# Patient Record
Sex: FEMALE | Race: OTHER
[De-identification: names, ages, dates, MRNs, and addresses within clinical notes are randomized per-mention and may not be internally consistent; named-entity substitution may affect disease eponyms.]

---

## 2017-09-06 ENCOUNTER — HOSPITAL ENCOUNTER (OUTPATIENT)
Dept: HOSPITAL 74 - FMAMMOTONE | Age: 52
Discharge: HOME | End: 2017-09-06
Attending: SURGERY
Payer: COMMERCIAL

## 2017-09-06 DIAGNOSIS — N60.31: ICD-10-CM

## 2017-09-06 DIAGNOSIS — R92.1: ICD-10-CM

## 2017-09-06 DIAGNOSIS — N64.89: Primary | ICD-10-CM

## 2017-09-06 DIAGNOSIS — N62: ICD-10-CM

## 2017-09-06 DIAGNOSIS — N60.11: ICD-10-CM

## 2017-09-06 PROCEDURE — A4648 IMPLANTABLE TISSUE MARKER: HCPCS

## 2017-09-06 PROCEDURE — 0HBT3ZX EXCISION OF RIGHT BREAST, PERCUTANEOUS APPROACH, DIAGNOSTIC: ICD-10-PCS | Performed by: SURGERY

## 2017-09-07 NOTE — PATH
Surgical Pathology Report



Patient Name:  LAUREN CALLAHAN

Accession #:  [id number]

[facility]. Rec. #:  [id number]                                                        

   /Age/Gender:  1965 (Age: 52) / F

Account:  [id number]                                                          

             Location: [place] [facility]

Taken:  2017

Received:  2017

Reported:  2017

Physicians:  Chari Kaur M.D.

  



Specimen(s) Received

A: RIGHT BREAST SPECIMEN- SITE 1 - WITH CALCIFICATIONS 

B: RIGHT BREAST SPECIMEN - SITE 2 - WITH CALCIFICATIONS 

C: RIGHT BREAST SPECIMEN - SITE 2 - WITHOUT CALCIFICATIONS 

D: RIGHT BREAST SPECIMEN - SITE 3 - WITH CALCIFICATIONS 

E: RIGHT BREAST SPECIMEN - SITE 3 - WITHOUT CALCIFIATIONS 





Clinical History

Nonpalpable lesion, biopsy proven atypical ductal hyperplasia right

Mammographic findings: Microcalcification, suspicious







Final Diagnosis

A. BREAST, RIGHT, SITE #1, UPPER OUTER POSTERIOR, WITH CALCIFICATIONS,

STEREOTACTIC BIOPSY:  

BENIGN BREAST TISSUE SHOWING COLUMNAR CELL CHANGE AND MICROCYST FORMATION WITH

ASSOCIATED CALCIFICATIONS.



B. BREAST, RIGHT, SITE #2, UPPER OUTER, WITH CALCIFICATIONS, STEREOTACTIC

BIOPSY:  

ATYPICAL DUCTAL HYPERPLASIA (ADH) WITH ASSOCIATED CALCIFICATIONS, ATYPICAL

LOBULAR HYPERPLASIA (ALH) AND FIBROCYSTIC CHANGES INCLUDING CYSTIC APOCRINE

METAPLASIA AND STROMAL FIBROSIS.



C. BREAST, RIGHT, SITE #2, UPPER OUTER, WITHOUT CALCIFICATIONS, STEREOTACTIC

BIOPSY:  

ATYPICAL DUCTAL HYPERPLASIA (ADH) WITH ASSOCIATED CALCIFICATIONS.



D. BREAST, RIGHT, SITE #3, WITH CALCIFICATIONS, UPPER OUTER, STEREOTACTIC

BIOPSY:  

BENIGN BREAST TISSUE SHOWING FIBROCYSTIC CHANGES INCLUDING CYSTIC APOCRINE

METAPLASIA AND STROMAL FIBROSIS.



E. BREAST, RIGHT, SITE #3, UPPER OUTER, WITHOUT CALCIFICATIONS, STEREOTACTIC

BIOPSY:  

BENIGN BREAST TISSUE SHOWING FIBROCYSTIC CHANGES INCLUDING STROMAL FIBROSIS AND

CYSTIC APOCRINE METAPLASIA.





***Electronically Signed***

Lauren Escobar M.D.





Gross Description

A. Received in formalin labeled "right breast with calcifications site 1-upper

outer posterior," is a 2.2 x 2.2 x 0.3 cm aggregate of multiple tan-yellow,

irregular to cylindrical portions of fibroadipose tissue. The formalin is

filtered and the specimen is entirely submitted in one cassette.



B. Received in formalin labeled "right breast with calcifications site 2-upper

outer," is a 2.3 x 2.0 x 0.3 cm aggregate of multiple tan-yellow, irregular to

cylindrical portions of fibroadipose tissue. The formalin is filtered and the

specimen is entirely submitted in one cassette.



C. Received in formalin labeled "right breast without calcifications site 2

upper outer," are 7 tan-yellow, cylindrical portions of fibroadipose tissue

ranging firm 0.5-1.5 cm in length and averaging 0.3 cm in diameter. The

specimens are submitted in toto in one cassette.



D. Received in formalin labeled "right breast with calcifications site 3 upper

outer," are 3 tan-yellow, cylindrical portions of fibroadipose tissue ranging

from 0.5-1.8 cm in length and averaging 0.3 cm in diameter. The specimens are

submitted in toto in one cassette.



E. Received in formalin labeled "right breast without calcifications site 3

upper outer," are 4 tan-yellow, cylindrical portions of fibroadipose tissue

ranging from 0.6-1.8 cm in length and averaging 0.3 cm in diameter. The

specimens are submitted in toto in one cassette.

Time to formalin fixation: Not given

Total formalin fixation time: Approximately 6 hours

## 2017-09-12 NOTE — OP
DATE OF OPERATION:  09/06/2017

 

PREOPERATIVE DIAGNOSIS:  Abnormal right mammography.

 

POSTOPERATIVE DIAGNOSIS:  Abnormal right mammography.

 

PROCEDURE:  Right breast stereotactic needle biopsy with clip x3.

 

SURGEON:  Chari Kaur MD

 

ANESTHESIA:  Local.

 

COMPLICATIONS:  None.

 

This was a sterile procedure.

 

INDICATION FOR PROCEDURE:  The patient presented with a screening mammography that

noted multiple areas of microcalcification in the upper outer right breast.  She had

a needle biopsy of the one closest to the nipple which showed atypical ductal

hypoplasia.  She had a preoperative MRI that noted two areas of enhancement in the

right 10 o'clock and right 11 o'clock locations, as well as others that seemed to

correspond to the calcifications.  They also noted an enhancement in the outer left

breast for which a directed ultrasound was recommended by the radiologist.  The

recommendation was an attempted stereotactic biopsy of at least 3 sets of

calcifications in the upper outer right breast to make sure there is no further

atypia or DCIS.  The procedure was discussed with her, including the need for a clip.

 

PROCEDURE IN DETAIL:  The patient was brought to [facility] in

[place], laid prone on the [place] table.  Using the lateral approach, the

calcifications first in the upper outer posteriorly were recognized.  A sterile prep

was obtained.  A target was chosen.  There was a positive stroke margin.  Using

Betadine and 1% lidocaine, a 10-gauge Suros device was used to take several cores in

this area.  The cores showed calcification within them.  These were handled using the

calcification protocol.  A T-shaped clip was deployed in the area.  Hemostasis was

assured with direct pressure.

 

Next, a second set of calcifications that were farthest away from this posterior site

was also recognized in the upper outer right breast.  A sterile prep was obtained.  A

target was chosen.  There was a positive stroke margin.  Using Betadine and 1%

lidocaine, a 10-gauge Suros device was used to take several cores in these

calcifications.  A bow-shaped petite clip was deployed in the area.  Hemostasis was

assured with direct pressure.  The specimen radiograph showed cores with

calcification.  These were handled using the calcification protocol.

 

Next, the third set of calcifications that was closer to the first set was

recognized.  A sterile prep was obtained.  A target was chosen.  There was a positive

stroke margin.  Using Betadine and 1% lidocaine, a 10-gauge Suros device was used to

take several cores in this cluster of microcalcifications, and specimen radiographs

showed cores with calcifications.  These were handled using the calcification

protocol.  A bar-shaped clip was deployed in the area.  Hemostasis was assured with

direct pressure.  Once the biopsies were all done and hemostasis was assured, the

incisions were all closed with Steri-Strips.  She tolerated the procedure well, left

the breast room and signed out in good condition.

 

REMI [id number]

DD: 09/12/2017 14:36

DT: 09/12/2017 17:24

Job #:  [id number]

## 2017-09-25 ENCOUNTER — HOSPITAL ENCOUNTER (INPATIENT)
Dept: HOSPITAL 74 - JSAMEDAYSX | Age: 52
LOS: 2 days | Discharge: HOME HEALTH | DRG: 581 | End: 2017-09-27
Attending: SURGERY | Admitting: SURGERY
Payer: COMMERCIAL

## 2017-09-25 VITALS — BODY MASS INDEX: 23.6 KG/M2

## 2017-09-25 DIAGNOSIS — D05.91: Primary | ICD-10-CM

## 2017-09-25 PROCEDURE — A9541 TC99M SULFUR COLLOID: HCPCS

## 2017-09-25 PROCEDURE — 0HBV0ZZ EXCISION OF BILATERAL BREAST, OPEN APPROACH: ICD-10-PCS | Performed by: SURGERY

## 2017-09-25 PROCEDURE — 0HUV0JZ SUPPLEMENT BILATERAL BREAST WITH SYNTHETIC SUBSTITUTE, OPEN APPROACH: ICD-10-PCS | Performed by: SURGERY

## 2017-09-25 PROCEDURE — 07B50ZX EXCISION OF RIGHT AXILLARY LYMPHATIC, OPEN APPROACH, DIAGNOSTIC: ICD-10-PCS | Performed by: SURGERY

## 2017-09-25 RX ADMIN — HYDROMORPHONE HYDROCHLORIDE PRN MG: 1 INJECTION, SOLUTION INTRAMUSCULAR; INTRAVENOUS; SUBCUTANEOUS at 21:33

## 2017-09-25 RX ADMIN — HYDROMORPHONE HYDROCHLORIDE PRN MG: 1 INJECTION, SOLUTION INTRAMUSCULAR; INTRAVENOUS; SUBCUTANEOUS at 16:31

## 2017-09-25 RX ADMIN — SODIUM CHLORIDE, POTASSIUM CHLORIDE, SODIUM LACTATE AND CALCIUM CHLORIDE SCH MLS: 600; 310; 30; 20 INJECTION, SOLUTION INTRAVENOUS at 16:00

## 2017-09-25 RX ADMIN — ACETAMINOPHEN SCH MG: 325 TABLET ORAL at 18:52

## 2017-09-25 NOTE — OP
Operative Note





- Note:


Operative Date: 09/25/17


Pre-Operative Diagnosis: Right breast atypical ductal hyperplasia


Operation: Bilateral Nipple-Sparing Mastectomies


Post-Operative Diagnosis: Same as Pre-op


Surgeon: Chari Kaur


Assistant: Lauren Looney


Anesthesiologist/CRNA: Dylon Nielson


Anesthesia: MAC


Specimens Removed: bilat breast tissue


Estimated Blood Loss (mls): 150


Fluid Volume Replaced (mls): 1,200


Operative Report Dictated: Yes

## 2017-09-25 NOTE — HP
History & Physical Update





- History


History: No Change





- Physical


Physical: No Change





- Assessment


Assessment: No Change





- Plan


Plan: No Change (patient cleared for surgery by PCP as of 9/15/17)

## 2017-09-25 NOTE — SURG
Surgery First Assist Note


First Assist: Lauren Looney PA-C


Date of Service: 09/25/17


Diagnosis: 


Absence of Both Breasts after Bilateral Nipple-Sparing Mastectomies





Procedure: 


 Bilateral Immediate Post-Mastectomy Breast Reconstruction using Shaped 

Submuscular Implants and Alloderm.


I was present for the entirety of the operative procedure. For further detail, 

please refer to operative report.








Visit type





- Case Type


Case Type: Scheduled Admission





- New patient


This patient is new to me today: Yes


Date on this admission: 09/25/17

## 2017-09-25 NOTE — OP
Operative Note





- Note:


Operative Date: 09/25/17


Pre-Operative Diagnosis: Absence of Both Breasts after Bilateral Nipple-Sparing 

Mastectomies


Operation: Bilateral Immediate Post-Mastectomy Breast Reconstruction using 

Shaped Submuscular Implants and Alloderm.


Implants: [place] MemoryShape Medium Height/High Profile Silicone Implants- 440cc


Post-Operative Diagnosis: Same as Pre-op


Surgeon: Mathew Jay


Assistant: Lauren Looney


Anesthesia: MAC (With Intercostal Block)


Drains & Tubes with Location: Gonzalez Mendoza Drains- Round, One Each Breast


Operative Report Dictated: Yes

## 2017-09-25 NOTE — SURG
Surgery First Assist Note


First Assist: Lauren Looney PA-C


Date of Service: 09/25/17


Diagnosis: 


Absence of Both Breasts after Bilateral Nipple-Sparing Mastectomies





Procedure: 


Bilateral Immediate Post-Mastectomy Breast Reconstruction using Shaped 

Submuscular Implants and Alloderm.


I was present for the entirety of the operative procedure. For further detail, 

please refer to operative report.








Visit type





- Case Type


Case Type: Scheduled Admission





- Emergency


Emergency Visit: No





- New patient


This patient is new to me today: Yes


Date on this admission: 09/25/17

## 2017-09-26 RX ADMIN — ACETAMINOPHEN SCH MG: 325 TABLET ORAL at 20:49

## 2017-09-26 RX ADMIN — ACETAMINOPHEN SCH MG: 325 TABLET ORAL at 01:24

## 2017-09-26 RX ADMIN — HYDROMORPHONE HYDROCHLORIDE PRN MG: 1 INJECTION, SOLUTION INTRAMUSCULAR; INTRAVENOUS; SUBCUTANEOUS at 05:08

## 2017-09-26 RX ADMIN — ACETAMINOPHEN SCH MG: 325 TABLET ORAL at 14:30

## 2017-09-26 RX ADMIN — ACETAMINOPHEN SCH MG: 325 TABLET ORAL at 07:38

## 2017-09-26 RX ADMIN — SODIUM CHLORIDE, POTASSIUM CHLORIDE, SODIUM LACTATE AND CALCIUM CHLORIDE SCH MLS/HR: 600; 310; 30; 20 INJECTION, SOLUTION INTRAVENOUS at 06:42

## 2017-09-26 NOTE — OP
DATE OF OPERATION:  09/25/2017

 

PREOPERATIVE DIAGNOSIS:  Absence of both breasts after bilateral nipple-sparing

mastectomies.  

 

POSTOPERATIVE DIAGNOSIS:  Absence of both breasts after bilateral nipple-sparing

mastectomies.  

 

PROCEDURE PERFORMED:  Bilateral immediate post-mastectomy breast reconstruction using

shaped submuscular implants with AlloDerm.  

 

SURGEON:  Mathew Jay MD

 

ANESTHESIA:  MAC with intercostal blocks.  

 

BRIEF HISTORY:  The patient is a 52-year-old female who is for bilateral

nipple-sparing mastectomies with Dr. Kaur, and reconstructive options including

both implant and autologous tissue have been discussed.  The patient is tall and thin

and understands that reconstruction initially likely will not be as large as the size

requested.  

 

THE PROCEDURE:  The patient was on the operating table at the conclusion of bilateral

nipple-sparing mastectomies by Dr. Kaur.  

 

The right breast was addressed first.  The submuscular pocket was developed from the

inferolateral border of the pectoralis major muscle superiorly.  Dissection continued

superiorly and medially with the assistance of fiberoptic lighted retractor.  A

medium-sized sheet of shaped perforated AlloDerm was soaked and brought onto the

field and trimmed to size.  The AlloDerm was sutured to the inferolateral border of

the pectoralis major muscle and the pocket was irrigated with a triple antibiotic

solution.  The implant selected was a [place] Memory Shaped Medium-Height High Profile

Silicone Implant, 440 mL in size.  Of note, the resected tissue from this side was

approximately 310 g.  The implant was placed with the assistance of a Reddy Funnel,

and the inferior marking was noted to be aligned correctly.  The AlloDerm was then

draped over the inferior portion of the implant and trimmed and sutured to the level

of the inframammary fold and extended laterally to prevent any lateral migration of

the implant.  A large round Gonzalez-Mendoza drain was inserted through a separate stab

incision in the anterior axillary line laterally, and sutured in place with a 2-0

silk suture.  The wound was then closed in layered fashion, deep tissues with No. 3-0

Biosyn suture in interrupted buried fashion and a deep dermal layer of 4-0 V-Loc 90

was used to close the skin.  

 

A similar procedure was performed on the left breast using the same size and style

implants.  

 

The wounds were further secured with Steri-Strips and sterile dressings were applied

consisting Kerlix and combine pads and secured with a surgical bra.  

 

The patient was then awoken from anesthesia without any difficulty and taken from the

operating room to the recovery room in satisfactory condition having tolerated the

procedure well.  

 

 

REMI [id number]

DD: 09/25/2017 13:28

DT: 09/26/2017 11:59

Job #:  [id number]

 

cc:  Chari Kaur MD

## 2017-09-26 NOTE — PN
Progress Note (short form)





- Note


Progress Note: 


pod 1 


 afebrile, vss, 





mastectomy flaps viable, JPs in place 





Ambulate, stop valium incentive spirometer

## 2017-09-26 NOTE — OP
DATE OF OPERATION:  09/25/2017

 

PREOPERATIVE DIAGNOSES:  Right breast atypia, abnormal right breast MRI.

 

POSTOPERATIVE DIAGNOSES:  Right breast atypia, abnormal right breast MRI.

 

PROCEDURE:  Bilateral nipple-sparing mastectomy and right sentinel node biopsy.

 

SURGEON:  Chari Smith MD

 

ASSISTANT:  Mathew Jay MD

 

ANESTHESIA:  Paravertebral block and sedation.

 

ESTIMATED BLOOD LOSS:  200 mL

 

DRAINS:  None.

 

COMPLICATIONS:  None.

 

This was a sterile procedure.

 

INDICATION FOR PROCEDURE:  Patient presented with a screening mammogram that 
noted

multiple areas of microcalcification in the upper outer right breast, that 
appeared

suspicious.  She had a stereotactic biopsy of 3 of the areas and one she had 
previously. 

Two of the spots came back as atypical ductal hyperplasia, and the other 2 were

benign.  She also had an MRI that noted a suspicious enhancing lesion that was

thought to be suspicious for malignancy.  I reviewed all these imaging and 
pathology

findings with the patient and her , and given the increased risk of 
breast

cancer, if not that there may be a possibility of finding breast cancer on MRI, 
she

did not really want to go through any other biopsies as she has been through 4 
or

more.  Therefore, the decision was to go ahead with bilateral nipple-sparing

mastectomy for prevention/possibly finding a breast cancer, and because of the

suspicious finding on imaging, I decided to do a sentinel node biopsy in case

carcinoma was found as a sentinel node biopsy cannot be done after the 
mastectomy is

completed.  The procedure was discussed with all of the questions answered.

 

PROCEDURE IN DETAIL:  Patient was brought to [facility] in 
[place]

and taken to Nuclear Medicine where technetium labelled sulfur colloid was 
injected at

the areolar border in the 10 to 11 o'clock location by the radiologist.  She was

brought up to the operating room, and after a paravertebral block and sedation, 
2.5

mL of methylene blue dye diluted with 2.5 mL of injectable saline were then 
injected

into the right subareolar plexus, and the breast was massaged for 5 minutes.  
Both

breasts and axillae were prepped and draped in usual sterile fashion.  A 4-cm

incision was made in the right axilla, carried down to the clavipectoral fascia 
to

identify right axillary sentinel node number 1 that was blue and hot.  There 
was a

second sentinel lymph node that was also blue and hot, and a third that was 
also blue

and hot, as well as a fourth that was blue and hot.  There was an additional 
lymph

node that I felt initially was hot, but an ex vivo did not really have any 
counts in

it.  Therefore, I sent it as right axillary nonsentinel node.  There was no 
other

blue dye radioactivity or pathological feeling ymph nodes in the right axilla. 

Therefore, once hemostasis was assured, the right mastectomy was performed. 

Incisions were made in the inframammary crease as marked out by Dr. Jay and a

nipple-sparing mastectomy was performed and then, the mastectomy was then 
tagged with

a long stitch laterally as well as a short stitch at the nipple, reflected off 
the

pectoralis muscle, and sent to Pathology for permanent section.  Hemostasis was

assured with electrocautery.  I took tissue behind the right nipple as a 
separate

specimen and sent that to Pathology as well.

 

Once hemostasis was assured, next, a left mastectomy was performed.  Again, the

inframammary incisions were made in the left breast.  Then, superiorly, a flap 
was

created to remove the breast from the skin up to the clavicle to the midline as 
well

as to the latissimus dorsi muscle laterally, and the breast was then reflected 
off

the pectoralis muscle, tagged with a long stitch lateral and a short stitch at 
the

nipple.  This was sent as left mastectomy.  Hemostasis was assured with

electrocautery.

 

She tolerated the procedure well.  She was left for Dr. Jay to finish the

reconstruction part of the procedure.

 

 

CHARI SMITH M.D.

 

[id number]

DD: 09/25/2017 11:58

DT: 09/26/2017 10:10

Job #:  [id number]

MTDD

## 2017-09-26 NOTE — PN
Progress Note (short form)





- Note


Progress Note: 


ANESTHESIA POSTOP:


51 yo female, POD #1, doing well. pain adequately controlled. tolerating po. 

encouraged ambulation and IS

## 2017-09-27 VITALS — TEMPERATURE: 98.6 F | DIASTOLIC BLOOD PRESSURE: 60 MMHG | HEART RATE: 82 BPM | SYSTOLIC BLOOD PRESSURE: 120 MMHG

## 2017-09-27 RX ADMIN — ACETAMINOPHEN SCH MG: 325 TABLET ORAL at 04:10

## 2017-09-27 RX ADMIN — ACETAMINOPHEN SCH: 325 TABLET ORAL at 13:08

## 2017-09-27 RX ADMIN — ACETAMINOPHEN SCH: 325 TABLET ORAL at 07:45

## 2017-09-27 RX ADMIN — ACETAMINOPHEN SCH: 325 TABLET ORAL at 14:50

## 2017-09-28 NOTE — PATH
Surgical Pathology Report



Patient Name:  LAUREN CALLAHAN

Accession #:  [id number]

[facility]. Rec. #:  [id number]                                                        

   /Age/Gender:  1965 (Age: 52) / F

Account:  [id number]                                                          

             Location: [address] [place] [address]

Taken:  2017

Received:  2017

Reported:  2017

Physicians:  Chari Kaur M.D.

  



Specimen(s) Received

A: RIGHT AXILLARY SENTINEL LYMPH NODE 

B: RIGHT AXILLARY SENTINEL LYMPH NODE 

C: RIGHT AXILLARY SENTINEL LYMPH NODE 

D: RIGHT AXILLARY SENTINEL LYMPH NODE 

E: RIGHT AXILLARY NON-SENTINEL LYMPH NODE 

F: RIGHT NIPPLE SPARING MASTECTOMY 

G: RIGHT BREAST TISSUE BEHIND NIPPLE 

H: LEFT NIPPLE SPARING MASTECTOMY 





Clinical History

Right breast atypia, ?DCIS







Final Diagnosis

A. SENTINEL LYMPH NODE #1, RIGHT AXILLARY, BIOPSY:  

ONE LYMPH NODE NEGATIVE FOR METASTATIC CARCINOMA BY H&E STAIN (0/1).



B. SENTINEL LYMPH NODE #2, RIGHT AXILLARY, BIOPSY:  

THREE LYMPH NODES NEGATIVE FOR METASTATIC CARCINOMA BY H&E STAIN (0/3).



C. SENTINEL LYMPH NODE #3, RIGHT AXILLARY, BIOPSY:  

ONE LYMPH NODE NEGATIVE FOR METASTATIC CARCINOMA BY H&E STAIN (0/1).



D. SENTINEL LYMPH NODE #4, RIGHT AXILLARY, BIOPSY:  

TWO LYMPH NODES NEGATIVE FOR METASTATIC CARCINOMA BY H&E STAIN (0/2).



E. LYMPH NODES, NON-SENTINEL, RIGHT AXILLARY, LYMPHADENECTOMY:  

ONE LYMPH NODE NEGATIVE FOR METASTATIC CARCINOMA (0/1).

BENIGN BREAST TISSUE WITH FIBROCYSTIC AND FIBROADENOMATOID CHANGE.



F. BREAST, RIGHT, NIPPLE SPARING MASTECTOMY:  

FOCAL DUCTAL CARCINOMA IN SITU (DCIS), INTERMEDIATE NUCLEAR GRADE, SOLID,

MICROPAPILLARY AND PAPILLARY TYPE.

DCIS EXTENT: DCIS IS PRESENT IN ONE OF 14 EXAMINED SLIGHTLY THE GREATEST EXTENT

OF ONE SLIDE OF 0.4 CM.

FOCAL LOBULAR CARCINOMA IN SITU (LCIS), CLASSIC TYPE.

SURGICAL RESECTION MARGINS: NEGATIVE FOR DCIS; POSTERIOR RESECTION MARGIN

(ANTERIOR SOFT TISSUE) IS >1.0 CM FROM DCIS.

ASSOCIATED PRIOR BIOPSY SITE CHANGES (x3).

FIBROCYSTIC CHANGE WITH ADENOSIS, CYSTIC APOCRINE METAPLASIA, COLUMNAR CELL

CHANGE, DUCT DILATATION, CYSTS FORMATION AND DENSE STROMAL FIBROSIS WITH FOCAL

ASSOCIATED MICROCALCIFICATIONS.

BENIGN INTRAMAMMARY LYMPH NODE, NEGATIVE FOR CARCINOMA (0/1)

PATHOLOGIC STAGING: REFER TO CHECKLIST BELOW

RECEPTOR STUDIES: REFER TO CHECKLIST BELOW.



Comment: Immunohistochemical stain for E-cadherin performed and interpreted at

[facility] on block F7 shows attenuated E-cadherin stain

consistent with lobular phenotype in LCIS. Immunohistochemical stains for p63

and SMM-HC performed and interpreted at [facility] on block

F2 do not show loss of myoepithelial cells, no invasive carcinoma is identified.

Immunohistochemical stain for Ae1/Ae3 keratin performed and interpreted at [facility] on block F7 is negative, supporting the interpretation

above.



G. BREAST, RIGHT, TISSUE BEHIND NIPPLE, BIOPSY:  

BENIGN BREAST TISSUE.

NO INVASIVE AND IN SITU CARCINOMA IDENTIFIED.



H. BREAST, LEFT, NIPPLE SPARING MASTECTOMY:  

BENIGN BREAST TISSUE WITH FIBROCYSTIC CHANGE WITH ADENOSIS, CYSTIC APOCRINE

METAPLASIA, DUCT DILATATION, CYSTS FORMATION AND DENSE STROMAL FIBROSIS.



Comments

PART F: DCIS of Breast: Surgical Pathology Cancer Case Summary 

Based on AJCC/UICC TNM, 7th edition



Procedure 

_x_ Nipple sparing mastectomy



Lymph Node Sampling

_x_ [place] and non-sentinel lymph nodes 



Specimen Laterality

_x_ Right



Estimated size (extent) of DCIS (greatest dimension using gross and microscopic

evaluation): Number of blocks with DCIS: 1

Number of blocks examined: 14

DCIS is present on one slide with the greatest extent of 0.4 cm.



Nuclear Grade 

_x_ Grade II (intermediate)



Necrosis

_x_ Present, focal (single cell necrosis)



Microcalcifications 

_x_ Present in non-neoplastic tissue



Margins 

_x_ Margins uninvolved by DCIS

     Distance from closest margin: >1.0 cm



Lymph Nodes 

Total number of nodes examined (sentinel and nonsentinel): 9

Number of sentinel nodes examined: 7



Lymph Node Involvement (required only if 1 or more lymph nodes have tumor cells

identified)

Number of lymph nodes with macrometastases (>2 mm): 0

Number of lymph nodes with micrometastases (>0.2 mm to 2 mm and/or >200 cells):

0

Number of lymph nodes with isolated tumor cells (=0.2 mm and =200 cells): 0

Size of largest metastatic deposit: n/a



Pathologic Staging (pTNM) 

Primary Tumor:  pTis (DCIS): Ductal carcinoma in situ

Regional Lymph Nodes (pN): pN0

Distant Metastasis (pM): not applicable



Biomarker Studies

ER and OK studies are pending; results will be reported in an addendum.



***Electronically Signed***

Alexander Finkelstein, M.D.



Addendum     

Reported: 2017



Addendum Diagnosis

Results of Estrogen Receptor (ER) and Progesterone Receptor (OK) studies

performed on block F2 at [facility] are as follows:

ER (clone [id number] mouse monoclonal antibody by [employer]): ~70% nuclear staining with

strong intensity (Positive).

OK (clone16 mouse monoclonal antibody by [employer]): ~90% nuclear staining with

strong intensity (Positive).



Positive and negative controls (internal if applicable) show appropriate

results.

Formalin fixation and cold ischemic times are within current ASCO/CAP

recommendations for ER, OK and Her2 testing.





 Alexander Finkelstein, M.D.  

 



Gross Description

A. Received in formalin, labeled "right axillary sentinel lymph node #1" is a

1.1 x 0.9 x 0.4 cm one tan rubbery lymph node which is bisected and submitted

entirely in one cassette.



B. Received in formalin, labeled "right axillary sentinel lymph node #2" and

three tan-blue lymph nodes ranging in size from 0.3-0.7 cm in greatest

dimension. The specimen is submitted as follows: Cassette #1- two whole lymph

nodes, cassette #2  one bisected lymph node.



C. Resection formalin, labeled "right axillary sentinel lymph node #3" is a

tan-blue lymph node 0.5 cm in greatest dimension. The specimen is bisected and

submitted entirely in one cassette.



D. Received in formalin, labeled "right sentinel lymph node #4" are two tan-blue

rubbery lymph node 0.5 and 0.9 cm in greatest dimension. The lymph nodes are

differentially inked and (blue and black), bisected and submitted in one

cassette.



E. Received in formalin, labeled "right axillary non-sentinel lymph node" are

two fragments of soft tissue 0.5 and 1.5 cm greatest dimension one of which is a

possible lymph node. The specimen is submitted as follows: 1 one bisected

fragment of soft tissue; 2 -possible bisected lymph node.



F. Received in formalin, labeled "right mastectomy" is a 315 gram, 16 x 1 x 2

point cm. right mastectomy specimen with a long suture marking the lateral

aspect of the specimen and the short suture marking nipple site, per the

surgeon. There is no skin or nipple present. The deep margin is inked black and

the anterior soft tissue margin is inked blue. The specimen is serially

sectioned from lateral to medial. Sectioning reveals three hemorrhagic foci in

the in the upper inner quadrant and abundant tan-white fibrous tissue with focal

cystic areas. Representative sections are submitted in 14 cassettes as follows: 

#1,2,3 -sections of hemorrhagic site#1 (UOQ), #4  section of hemorrhagic site #2

(OUQ), #5,6,7 -sections of hemorrhagic site #3 (UOQ), #7, #8 section of

retroareolar area, #9 representative sections of upper inner quadrant, #10

representative sections of lower inner quadrant, #11 additional representative

sections of upper outer quadrant, #12- representative sections of lower outer

quadrant, #13 additional hemorrhagic area in the lower inner quadrant, #14

representative deep margin.

Time to formalin fixation: <1h

Total formalin fixation time: 29h.



G. Received in formalin, labeled "right breast tissue behind nipple" is a 1 x 1

x 0.3 cm fragment of tan soft tissue. Submitted entirely in one cassette three



H.  Received in formalin, labeled "left mastectomy" is a 285 gram, 16 x 11 x 2

point cm. left mastectomy specimen with a long suture marking the lateral aspect

of the specimen and a short suture designating nipple site, per the surgeon.

There is no skin or nipple present. The deep margin is inked black and the

anterior soft tissue margin is inked blue. The specimen is serially sectioned

from lateral to medial. Sectioning reveals multiple foci are tan-white fibrous

tissue with focal cystic change. Representative sections are submitted in 10

cassettes as follows:  #1,2 representatives of upper outer quadrant, #3,4

representatives of lower outer quadrant, #5-6  representatives of upper inner

quadrant, #7,8 -representative of lower inner quadrant, #9 representative

retroareolar area, #10 representative of deep margin.

Time to formalin fixation: <1h

Total formalin fixation time: 29h.

   

AF/2017



final/2017

## 2018-07-25 ENCOUNTER — HOSPITAL ENCOUNTER (OUTPATIENT)
Dept: HOSPITAL 74 - JASU-SURG | Age: 53
Discharge: HOME | End: 2018-07-25
Attending: PLASTIC SURGERY
Payer: COMMERCIAL

## 2018-07-25 VITALS — SYSTOLIC BLOOD PRESSURE: 112 MMHG | DIASTOLIC BLOOD PRESSURE: 67 MMHG | HEART RATE: 71 BPM

## 2018-07-25 VITALS — TEMPERATURE: 98.1 F

## 2018-07-25 VITALS — BODY MASS INDEX: 24.4 KG/M2

## 2018-07-25 DIAGNOSIS — N65.1: Primary | ICD-10-CM

## 2018-07-25 DIAGNOSIS — T85.44XA: ICD-10-CM

## 2018-07-25 DIAGNOSIS — Z85.3: ICD-10-CM

## 2018-07-25 PROCEDURE — 0HNV0ZZ RELEASE BILATERAL BREAST, OPEN APPROACH: ICD-10-PCS | Performed by: PLASTIC SURGERY

## 2018-07-25 NOTE — OP
Operative Note





- Note:


Operative Date: 07/25/18


Pre-Operative Diagnosis: Asymmetry after Breast Reconstruction with Capsule 

Contracture constricting Lower Breast


Operation: Bilateral Breast Reconstruction Revision, Removal of Right and Left 

Breast Prostheses, Bilateral Capsulotomies of the Inferior Breasts, Reduction 

of the Left Lateral Implant Pocket


Implants: [place] Smooth Silicone 550cc x 2


Post-Operative Diagnosis: Same as Pre-op


Surgeon: Mathew Jay


Assistant: Lauren Looney


Anesthesiologist/CRNA: Lester Newman


Anesthesia: General


Specimens Removed: Bilateral Breast Prostheses


Estimated Blood Loss (mls): 5


Operative Report Dictated: Yes

## 2018-07-25 NOTE — OP
DATE OF OPERATION:  07/25/2018

 

PREOPERATIVE DIAGNOSIS:  Asymmetry after breast reconstruction with capsule

contracture constricting lower breasts.  

 

POSTOPERATIVE DIAGNOSIS:  Asymmetry after breast reconstruction with capsule

contracture constricting lower breasts.  

 

PROCEDURE PERFORMED:  Bilateral breast reconstruction revision with removal of right

and left breast prostheses, bilateral capsulotomies of the inferior breasts, and

reduction of the left lateral implant pocket.  

 

SURGEON:  Mathew Neely M.D. 

 

ASSISTANT:  PILY Montoya 

 

ANESTHESIA:  General via endotracheal tube.

BRIEF HISTORY:  The patient is status post bilateral nipple sparing mastectomies with

subsequent reconstruction for bilateral DCIS.  The patient had a complicated course

with partial loss of the right nipple areolar complex after the mastectomies which

complicated and delayed reconstruction.  Reconstruction was performed, and the

patient now presents with asymmetry after reconstruction with both breasts being

smaller than desired, and the left breast prosthesis sitting far lateral compared to

the right.  She now presents for correction.  

 

DESCRIPTION OF PROCEDURE:  The patient was placed on the operating table in the

supine position and general anesthesia was administered by the anesthesiologist.  The

area of the chest was prepped and draped in the usual sterile fashion.  The patient

was marked in standing position prior to surgery, and these markings were now used as

a guide for surgery.  The right breast was addressed first, and the previous

inframammary scar was reopened using number 15 scalpel blade.  This was carried down

through subcutaneous tissues using electrocautery until the existing implant was

exposed.  Subcutaneous dissection continued the length of the incision, and the

implant was removed without difficulty.  The implant removed was a silicone contoured

shaped implant 440 mL in size.  Fiberoptic lighted retractor was used to explore the

pocket and enlarge the pocket both superomedially and laterally.  The implant

selected was a [place] round smooth silicone gel implant high profile, 550 mL in size.

 The implant pocket was washed with a solution of dilute Betadine, and the implant

was inserted with the assistance of a Reddy funnel without any difficulty.  The

implant pocket was further adjusted with the implant in place using a fiberoptic

lighted retractor.  This wound was then closed in layered fashion.  Deep tissues were

closed with number 3-0 Biosyn suture interrupted buried fashion, and skin was closed

with a deep dermal layer of 4-0 V-Loc 90 suture in continuous fashion.  A similar

procedure was performed on the left breast.  However, the previous implant pocket was

extended significantly laterally causing the implant to displace laterally in a

significant fashion.  A 3-0 Ethibond suture was used to close this lateral pocket in

interrupted fashion.  A similar size and shape implant was used, and the patient was

moved to the sitting position, where a good symmetry was appreciated.  The incisions

were closed in similar fashion, and once closed the incisions were secured with

Steri-Strips.  Sterile fluff dressings were used and secured with a surgical bra. 

The patient was then awoken from anesthesia without any difficulty and taken from the

operating room to the recovery room in satisfactory condition having tolerated the

procedure well.  

 

 

MATHEW NEELY M.D.

 

/[id number]

DD: 07/25/2018 16:10

DT: 07/25/2018 18:13

Job #:  [id number]

## 2018-07-25 NOTE — OP
Operative Note





- Note:


Operative Date: 07/25/18


Pre-Operative Diagnosis: Asymmetry of breast after reconstruction


Operation: Revision of bilateral breast reconstruction with implants


Post-Operative Diagnosis: Same as Pre-op


Surgeon: Mathew Jay


Assistant: Lauren Looney


Anesthesiologist/CRNA: Lester Newman


Anesthesia: General


Estimated Blood Loss (mls): 10


Fluid Volume Replaced (mls): 500


Operative Report Dictated: Yes

## 2018-07-25 NOTE — SURG
Surgery First Assist Note


First Assist: Lauren Looney PA-C


Date of Service: 07/25/18


Diagnosis: 





 Asymmetry after Breast Reconstruction with Capsule Contracture constricting 

Lower Breast


t


Procedure: 





 Bilateral Breast Reconstruction Revision, Removal of Right and Left Breast 

Prostheses, Bilateral Capsulotomies of the Inferior Breasts, Reduction of the 

Left Lateral Implant Pocket


I was present for the entirety of the operative procedure. For further detail, 

please refer to operative report.








Visit type





- Case Type


Case Type: Scheduled





- Emergency


Emergency Visit: No





- New patient


This patient is new to me today: Yes


Date on this admission: 07/25/18

## 2018-07-27 NOTE — PATH
Surgical Pathology Report



Patient Name:  LAUREN CALLAHAN

Accession #:  [id number]

Med. Rec. #:  [id number]                                                        

   /Age/Gender:  1965 (Age: 52) / F

Account:  [id number]                                                          

             Location: [facility] SURGICAL

Taken:  2018

Received:  2018

Reported:  2018

Physicians:  Mathew Jay M.D.

  



Specimen(s) Received

A: RIGHT BREAST IMPLANT REMOVED 

B: LEFT BREAST IMPLANT REMOVED 





Clinical History

History of breast cancer







Final Diagnosis

A. BREAST IMPLANT, RIGHT, REMOVAL:

BREAST IMPLANT. MACROSCOPIC DIAGNOSIS.



B.  BREAST IMPLANT, LEFT, REMOVAL:

BREAST IMPLANT. MACROSCOPIC DIAGNOSIS.







***Electronically Signed***

Virginia Covington M.D.





Gross Description

A. Received fresh labeled "removed right implant," is a 12.5 x 12.0 x 5.5 cm

tan, rubbery, intact breast implant. No soft tissue is present. No sections are

submitted, gross only.



B. Received fresh labeled "removed left implant," is a 12.5 x 12.0 x 5.5 cm tan,

rubbery, intact breast implant. No soft tissue is present. No sections are

submitted, gross only.

DL/2018



saudi/2018

## 2018-12-10 ENCOUNTER — HOSPITAL ENCOUNTER (OUTPATIENT)
Dept: HOSPITAL 74 - JASU-SURG | Age: 53
Discharge: HOME | End: 2018-12-10
Attending: PLASTIC SURGERY
Payer: COMMERCIAL

## 2018-12-10 VITALS — BODY MASS INDEX: 24.4 KG/M2

## 2018-12-10 VITALS — HEART RATE: 77 BPM | DIASTOLIC BLOOD PRESSURE: 71 MMHG | SYSTOLIC BLOOD PRESSURE: 120 MMHG | TEMPERATURE: 97.9 F

## 2018-12-10 DIAGNOSIS — Z85.3: ICD-10-CM

## 2018-12-10 DIAGNOSIS — T85.42XS: Primary | ICD-10-CM

## 2018-12-10 DIAGNOSIS — N64.89: ICD-10-CM

## 2018-12-10 PROCEDURE — 0HWT0JZ REVISION OF SYNTHETIC SUBSTITUTE IN RIGHT BREAST, OPEN APPROACH: ICD-10-PCS | Performed by: PLASTIC SURGERY

## 2018-12-10 PROCEDURE — 0HWU0JZ REVISION OF SYNTHETIC SUBSTITUTE IN LEFT BREAST, OPEN APPROACH: ICD-10-PCS | Performed by: PLASTIC SURGERY

## 2018-12-10 NOTE — OP
DATE OF OPERATION:  12/10/2018

 

AGE:  53.

 

SEX:  Female.

 

PREOPERATIVE DIAGNOSIS:  History of breast cancer, bilateral breast implant

malposition after reconstructions.

 

POSTOPERATIVE DIAGNOSIS:  History of breast cancer, bilateral breast implant

malposition after reconstructions.

 

PROCEDURE PERFORMED:  Bilateral revision of breast reconstructions, bilateral removal

of prostheses, revision of implant pockets, bilateral insertion of AlloDerm sheets,

and bilateral insertion of new prostheses.

 

SURGEON:  Mathew Jay MD

 

ASSISTANT:  JESSICA Montoya

 

ANESTHESIA:  General via endotracheal tube.

 

BRIEF HISTORY:  The patient is a 53-year-old female who is status post bilateral

nipple-sparing mastectomies with subsequent implant reconstruction.  The right

reconstruction was complicated by an open wound that developed at the inferior

margins of her right nipple-areolar complex with partial loss of the complex.  This

delayed reconstruction on the right side, and the patient now presents with

displacement of her implants, the right implant displaced medially and the left

implant displaced slightly laterally.  She presents for repair of these issues.

 

PROCEDURE:  The patient was on the operating table in supine position, and general

anesthesia was administered by the anesthesiologist.  The area of the chest was

prepped and draped in usual sterile fashion.  The right breast was addressed first. 

The previously used inframammary scar was reopened using number 15 scalpel blade. 

Dissection was carried down through subcutaneous tissues using electrocautery.  The

capsule of the implant was identified and entered, and the implant was removed

without difficulty.  A fiberoptic lighted retractor was then used to expose the

submuscular pocket of the right breast, and a slight symmastia was repaired, placing

multiple 2-0 Ethibond sutures at the medial aspect of the pocket.  A similar

procedure was performed on the left breast, where similar 2-0 Ethibond sutures were

placed laterally to prevent lateral migration of the implants.  The implant selected

for the right breast was a [place] high profile smooth silicone implant 650 mL in

size.  A similar implant was selected for the left side, and after irrigating the

pockets with dilute Betadine solution, both implants were inserted with the

assistance of a Reddy funnel.  A single sheet of AlloDerm was used and split in half

to use on each side in the lower pole of the breasts to provide lower pole support. 

The AlloDerm was split in half, and 2 triangular pieces were used.  The apex of the

triangular piece on the right breast was first inserted and fixed in place using a

3-0 Biosyn suture in a horizontal mattress fashion.  The AlloDerm was then reflected

over the inferior pole of the implant and marked and cut to size.  The AlloDerm was

then reflected around the lower pole and under the bottom portion of the implant and

sutured in place just above the inframammary fold using 3-0 Ethibond sutures in

interrupted horizontal mattress fashion.  A similar procedure was performed on the

left breast.  The wounds were then closed in layered fashion.  Deep tissues were

closed with number 3-0 Biosyn suture in interrupted buried fashion, and skin was

closed using a 40 V-Loc 90 suture in continuous intradermal fashion.  Wounds were

further secured with Steri-Strips, and sterile dressings were applied consisting of

combine bolsters to reduce pressure on the internal suture lines and secured with a

surgical bra.  The patient was then awoken from anesthesia without any difficulty and

taken from the operating room to the recovery room in satisfactory condition, having

tolerated the procedure well.

 

 

REMI BARBER/[id number]

DD: 12/10/2018 13:39

DT: 12/10/2018 21:11

Job #:  [id number]

## 2018-12-10 NOTE — OP
Operative Note





- Note:


Operative Date: 12/10/18


Pre-Operative Diagnosis: History of Breast Cancer, Bilateral Breast Implant 

Malposition after Reconstruction


Operation: Bilateral Revision of Breast Reconstruction, Bilateral Removal of 

Prostheses, Revision of Implant Pockets, Bilateral Insertion of Alloderm Sheets

, Bilateral Insertion of New Prostheses.


Implants: [place] High Profile Smooth Silicone Implants 650cc


Post-Operative Diagnosis: Same as Pre-op


Surgeon: Mathew Jay


Assistant: Lauren Looney


Anesthesia: General


Operative Report Dictated: Yes

## 2018-12-10 NOTE — HP
History & Physical Update





- History


History: No Change





- Physical


Physical: No Change





- Assessment


Assessment: No Change





- Plan


Plan: No Change (No change since visit with Dr Jay on 12/4/18)

## 2018-12-12 NOTE — PATH
Surgical Pathology Report



Patient Name:  LAUREN CALLAHAN

Accession #:  [id number]

Med. Rec. #:  [id number]                                                        

   /Age/Gender:  1965 (Age: 53) / F

Account:  [id number]                                                          

             Location: [facility] SURGICAL

Taken:  12/10/2018

Received:  2018

Reported:  2018

Physicians:  Mathew Jay M.D.

  



Specimen(s) Received

 OLD BILATERAL BREAST IMPLANT 





Clinical History

Breast cancer







Final Diagnosis

BREAST IMPLANTS, BILATERAL, REMOVAL:

BREAST IMPLANTS (2). MACROSCOPIC DIAGNOSIS.





***Electronically Signed***

Virginia Covington M.D.





Gross Description

Received fresh labeled "bilateral breast implant," are 2 clear, rubbery, intact

breast implants averaging 13.5 cm in diameter and 4.0 cm in depth. No soft

tissue is present. No sections are submitted, gross only.

## 2020-01-21 ENCOUNTER — HOSPITAL ENCOUNTER (OUTPATIENT)
Dept: HOSPITAL 74 - JASU-SURG | Age: 55
Discharge: HOME | End: 2020-01-21
Attending: PLASTIC SURGERY
Payer: COMMERCIAL

## 2020-01-21 VITALS — DIASTOLIC BLOOD PRESSURE: 65 MMHG | HEART RATE: 67 BPM | SYSTOLIC BLOOD PRESSURE: 112 MMHG

## 2020-01-21 VITALS — BODY MASS INDEX: 23 KG/M2

## 2020-01-21 VITALS — TEMPERATURE: 97.9 F

## 2020-01-21 DIAGNOSIS — Z98.82: ICD-10-CM

## 2020-01-21 DIAGNOSIS — N65.0: Primary | ICD-10-CM

## 2020-01-21 DIAGNOSIS — L90.5: ICD-10-CM

## 2020-01-21 DIAGNOSIS — L91.0: ICD-10-CM

## 2020-01-21 PROCEDURE — 0HWU0JZ REVISION OF SYNTHETIC SUBSTITUTE IN LEFT BREAST, OPEN APPROACH: ICD-10-PCS | Performed by: PLASTIC SURGERY

## 2020-01-21 PROCEDURE — 0HB5XZX EXCISION OF CHEST SKIN, EXTERNAL APPROACH, DIAGNOSTIC: ICD-10-PCS | Performed by: PLASTIC SURGERY

## 2020-01-21 PROCEDURE — 0HWT0JZ REVISION OF SYNTHETIC SUBSTITUTE IN RIGHT BREAST, OPEN APPROACH: ICD-10-PCS | Performed by: PLASTIC SURGERY

## 2020-01-21 NOTE — OP
Operative Note





- Note:


Operative Date: 01/21/20


Pre-Operative Diagnosis: Breast Discomfort with Symmastia after Reconstruction


Operation: Bilateral Breast Reconstruction Revision with Change to Smaller 

Implants and repair of Symmastia.  Removal of Unincorporated Alloderm Left 

Breast, Excision of Thick Painful Scar Right Axilla.


Implants: [place] Memory Gel Xtra 560cc x 2


Post-Operative Diagnosis: Same as Pre-op


Surgeon: Mathew Jay


Assistant: Eladio Saenz


Anesthesia: General


Specimens Removed: Bilateral Breast Prostheses, Segment of Alloderm Left Breast


Operative Report Dictated: Yes

## 2020-01-21 NOTE — OP
DATE OF OPERATION:  01/21/2020

 

PREOPERATIVE DIAGNOSIS:  Breast discomfort with symmastia after reconstruction.

 

POSTOPERATIVE DIAGNOSIS:  Breast discomfort with symmastia after reconstruction.

 

PROCEDURE PERFORMED:  Bilateral breast reconstruction revision with change to smaller

implants and repair of symmastia, removal of unincorporated Alloderm, left breast,

and excision of painful scar, right axilla.

 

SURGEON:  Mathew Neely MD 

 

ANESTHESIA:  General via LMA.

 

PROCEDURE:  The patient was on the operating room table in supine position, and

general anesthesia was administered by the anesthesiologist.  The area of the chest

was prepped and draped in the usual sterile fashion.  The right breast was addressed

first.  An inframammary incision was made along the previous surgical scar.  This was

carried down sharply through subcutaneous tissues.  Electrocautery was then used to

complete the dissection until the implant was exposed.  The implant was removed

without any difficulty, and a lighted retractor was used to examine the pocket.  The

symmastia was not complete; however, repair was necessary.  Multiple interrupted 2-0

Ethibond sutures were used medially to reinforce the medial border of the pocket and

close the pocket medially.  A similar procedure was done to the left breast.  In

addition, on the right side, the lateral portion of the pocket was extended using

electrocautery.  The implant selected was a 560-mL [place] MemoryGel Xtra breast

implant, and this was inserted with the assistance of Barry Hurt without

difficulty.  The same size and type implant was used on the contralateral breast.  A

redundant portion of Alloderm in the inferior pole of the left breast was noted to be

unincorporated, and this was excised and sent for specimen.  The patient was moved to

a sitting position, and good symmetric result was appreciated, so closure was begun. 

Deep tissues were closed with No. 3-0 and 4-0 Biosyn suture in interrupted fashion,

and a deep dermal layer of 4-0 V-Loc 90 suture was used for skin.  An elliptical

excision of a hypertrophic scar of the right axilla was then performed and closed in

layered fashion.  Deep tissues were closed with No. 4-0 Biosyn suture in interrupted,

buried fashion, and several 5-0 nylon simple sutures were used to close skin. 

Sterile dressings were then applied including a reinforcing pad between the breasts

centrally to alleviate pull during the healing process on the symmastia sutures that

were placed.  This was secured with a surgical bra.  The patient was then awoken from

anesthesia without any difficulty and taken from the operating room to the recovery

room in satisfactory condition having tolerated the procedure well.

 

 

MATEHW NEELY M.D.

 

/[id number]

DD: 01/21/2020 14:40

DT: 01/21/2020 16:14

Job #:  [id number]

## 2020-01-23 NOTE — PATH
Surgical Pathology Report



Patient Name:  LAUREN CALLAHAN

Accession #:  

Med. Rec. #:  [id number]                                                        

   /Age/Gender:  1965 (Age: 54) / F

Account:  [id number]                                                          

             Location: [facility] SURGICAL

Taken:  2020

Received:  2020

Reported:  2020

Physicians:  Mathew Jay M.D.

  



Specimen(s) Received

A: KELOID 

B: ALLODERM LEFT BREAST 

C: RIGHT BREAST IMPLANT 

D: LEFT BREAST IMPLANT 





Clinical History

Bilateral breast reconstruction







Final Diagnosis

A. KELOID SCAR, RIGHT AXILLA, EXCISION:

PORTION OF SKIN WITH FOCAL ULCERATION, ACUTE INFLAMMATION, AND HYPERTROPHIC

SCAR.



B. LEFT BREAST ALLODERM, EXCISION:

PORTION OF FIBROUS TISSUE CONSISTENT WITH ALLODERM WITH FOCAL CHRONIC

INFLAMMATION AND HISTIOCYTIC/GIANT CELL REACTION IN ASSOCIATION WITH FOREIGN

MATERIAL.



C. RIGHT BREAST IMPLANT, REMOVAL:

CONSISTENT WITH BREAST IMPLANT, GROSS EXAMINATION ONLY.



D. LEFT BREAST IMPLANT, REMOVAL:

CONSISTENT WITH BREAST IMPLANT, GROSS EXAMINATION ONLY.







***Electronically Signed***

Balbir Park M.D.





Gross Description

A. Received in formalin labeled "keloid scar right axilla," is a 0.7 x 0.3 cm

tan, elliptical portion of skin excised to a depth of 0.3 cm. The base is inked

green and the specimen is trisected. The specimen is entirely submitted in one

cassette.



B. Received in formalin labeled "left breast AlloDerm," is a 5.5 x 4.0 x 0.1 cm

tan portion of skin-like material, consistent with AlloDerm. A representative

section is submitted in one cassette.



C. Received fresh labeled "right breast implant," is a 14.5 cm in diameter x 4.5

cm in depth clear, rubbery, intact breast implant. No soft tissue is present. No

sections are submitted, gross only.



D. Received fresh labeled "left breast implant," is a 14.5 cm in diameter x 4.5

cm in depth clear, rubbery, intact breast implant. No soft tissue is present. No

sections are submitted, gross only.

DL/2020



saudi/[email or web address]

## 2020-01-24 NOTE — SURG
Surgery First Assist Note


First Assist: Eladio Saenz PA-C


Date of Service: 01/21/20


Diagnosis: 


Breast Discomfort with Symmastia after Reconstruction








Procedure: 


 Bilateral Breast Reconstruction Revision with Change to Smaller Implants and 

repair of Symmastia.  Removal of Unincorporated Alloderm Left Breast, Excision 

of Thick Painful Scar Right Axilla.





I was present for the entirety of the operative procedure. For further detail, 

please refer to operative report.








Visit type





- Case Type


Case Type: Scheduled





- Emergency


Emergency Visit: No





- New patient


This patient is new to me today: Yes


Date on this admission: 01/24/20





- Critical Care


Critical Care patient: No